# Patient Record
Sex: FEMALE | Race: BLACK OR AFRICAN AMERICAN | URBAN - METROPOLITAN AREA
[De-identification: names, ages, dates, MRNs, and addresses within clinical notes are randomized per-mention and may not be internally consistent; named-entity substitution may affect disease eponyms.]

---

## 2020-06-24 ENCOUNTER — OFFICE VISIT (OUTPATIENT)
Dept: URBAN - METROPOLITAN AREA TELEHEALTH 2 | Facility: TELEHEALTH | Age: 40
End: 2020-06-24
Payer: COMMERCIAL

## 2020-06-24 DIAGNOSIS — K21.0 GERD WITH ESOPHAGITIS: ICD-10-CM

## 2020-06-24 PROCEDURE — 99214 OFFICE O/P EST MOD 30 MIN: CPT | Performed by: INTERNAL MEDICINE

## 2020-06-24 RX ORDER — PANTOPRAZOLE SODIUM 40 MG/1
1 TABLET TABLET, DELAYED RELEASE ORAL ONCE A DAY
Qty: 30 TABLET | Refills: 3 | OUTPATIENT
Start: 2020-06-24

## 2020-06-24 NOTE — HPI-TODAY'S VISIT:
The patient is a 38 yo F here for follow up of an EGD performed for GERD.  The examination revealed eosinophilic esophagitis.  The patient denies known food allergies, dysphagia and vomiting but does admit to heartburn.

## 2020-11-03 ENCOUNTER — TELEPHONE ENCOUNTER (OUTPATIENT)
Dept: URBAN - METROPOLITAN AREA CLINIC 92 | Facility: CLINIC | Age: 40
End: 2020-11-03

## 2020-12-22 ENCOUNTER — OFFICE VISIT (OUTPATIENT)
Dept: URBAN - METROPOLITAN AREA CLINIC 92 | Facility: CLINIC | Age: 40
End: 2020-12-22
Payer: COMMERCIAL

## 2020-12-22 VITALS
HEART RATE: 70 BPM | TEMPERATURE: 97.1 F | BODY MASS INDEX: 26.98 KG/M2 | WEIGHT: 158 LBS | SYSTOLIC BLOOD PRESSURE: 128 MMHG | DIASTOLIC BLOOD PRESSURE: 72 MMHG | HEIGHT: 64 IN

## 2020-12-22 DIAGNOSIS — K92.1 RECTAL BLEEDING: ICD-10-CM

## 2020-12-22 DIAGNOSIS — K20.0 EOSINOPHILIC ESOPHAGITIS: ICD-10-CM

## 2020-12-22 PROCEDURE — 99214 OFFICE O/P EST MOD 30 MIN: CPT | Performed by: INTERNAL MEDICINE

## 2020-12-22 RX ORDER — PANTOPRAZOLE SODIUM 40 MG/1
1 TABLET TABLET, DELAYED RELEASE ORAL ONCE A DAY
Qty: 30 TABLET | Refills: 3 | Status: ACTIVE | COMMUNITY
Start: 2020-06-24

## 2020-12-22 RX ORDER — FLUTICASONE PROPIONATE 220 UG/1
1 PUFF AEROSOL, METERED RESPIRATORY (INHALATION) TWICE A DAY
Qty: 1 | Refills: 1 | OUTPATIENT
Start: 2020-12-22 | End: 2021-04-21

## 2020-12-22 NOTE — HPI-OTHER HISTORIES
The  patient is here for follow up of an EGD February foe epigastric pain.  The examinaton demonstrated gastric erosions.  Biopsies of the esophagus revealed eosiniophilic esophagitis.  Dysphagia symptoms have not improved with daily PPI use.  Patient states pills get stuck in her esophagus.  She takes extra care to chew her food well.    She also reports three episodes of rectal bleeding over 7 months.  Blood was noted in the bowl.  She denies straining.  Bleeding lasted for three days.  Bowel movements are daily and she has the sensation of complete emptying.   Each time she bled she had pain with the passage of stool.  This has not happened since Ocotber 2020.    A colonoscopy 2019 was unremarkable.

## 2020-12-23 ENCOUNTER — WEB ENCOUNTER (OUTPATIENT)
Dept: URBAN - METROPOLITAN AREA CLINIC 92 | Facility: CLINIC | Age: 40
End: 2020-12-23

## 2021-02-23 ENCOUNTER — OFFICE VISIT (OUTPATIENT)
Dept: URBAN - METROPOLITAN AREA CLINIC 92 | Facility: CLINIC | Age: 41
End: 2021-02-23
Payer: COMMERCIAL

## 2021-02-23 DIAGNOSIS — K20.0 EOSINOPHILIC ESOPHAGITIS: ICD-10-CM

## 2021-02-23 DIAGNOSIS — K21.9 GASTROESOPHAGEAL REFLUX DISEASE WITHOUT ESOPHAGITIS: ICD-10-CM

## 2021-02-23 PROCEDURE — 99214 OFFICE O/P EST MOD 30 MIN: CPT | Performed by: INTERNAL MEDICINE

## 2021-02-23 RX ORDER — FLUTICASONE PROPIONATE 220 UG/1
1 PUFF AEROSOL, METERED RESPIRATORY (INHALATION) TWICE A DAY
Qty: 1 | Refills: 1 | Status: ON HOLD | COMMUNITY
Start: 2020-12-22 | End: 2021-04-21

## 2021-02-23 RX ORDER — PANTOPRAZOLE SODIUM 40 MG/1
1 TABLET TABLET, DELAYED RELEASE ORAL ONCE A DAY
Qty: 30 TABLET | Refills: 3
Start: 2020-06-24

## 2021-02-23 RX ORDER — PANTOPRAZOLE SODIUM 40 MG/1
1 TABLET TABLET, DELAYED RELEASE ORAL ONCE A DAY
Qty: 30 TABLET | Refills: 3 | Status: ON HOLD | COMMUNITY
Start: 2020-06-24

## 2021-02-23 NOTE — HPI-TODAY'S VISIT:
The patient is doing well since Flovent was initiated De ember 2020 for eosinophilic esophagitis.  She has only had a single episode of  dysphagia.  Last week she developed abdominal pain and a sour stomach after consuming pasta with jennifer.   Her symptoms have not responded to OTC Nexium and Pepto Bismol.  Pantoprazole has been effective in gthe past but she ran out of meds.

## 2021-02-23 NOTE — HPI-OTHER HISTORIES
(December 2020) The  patient is here for follow up of an EGD February foe epigastric pain.  The examinaton demonstrated gastric erosions.  Biopsies of the esophagus revealed eosiniophilic esophagitis.  Dysphagia symptoms have not improved with daily PPI use.  Patient states pills get stuck in her esophagus.  She takes extra care to chew her food well.    She also reports three episodes of rectal bleeding over 7 months.  Blood was noted in the bowl.  She denies straining.  Bleeding lasted for three days.  Bowel movements are daily and she has the sensation of complete emptying.   Each time she bled she had pain with the passage of stool.  This has not happened since Ocotber 2020.    A colonoscopy 2019 was unremarkable.

## 2022-02-22 ENCOUNTER — WEB ENCOUNTER (OUTPATIENT)
Dept: URBAN - METROPOLITAN AREA CLINIC 92 | Facility: CLINIC | Age: 42
End: 2022-02-22

## 2022-02-22 ENCOUNTER — OFFICE VISIT (OUTPATIENT)
Dept: URBAN - METROPOLITAN AREA CLINIC 92 | Facility: CLINIC | Age: 42
End: 2022-02-22
Payer: COMMERCIAL

## 2022-02-22 ENCOUNTER — DASHBOARD ENCOUNTERS (OUTPATIENT)
Age: 42
End: 2022-02-22

## 2022-02-22 VITALS
DIASTOLIC BLOOD PRESSURE: 87 MMHG | HEIGHT: 64 IN | HEART RATE: 76 BPM | TEMPERATURE: 97.3 F | WEIGHT: 150 LBS | SYSTOLIC BLOOD PRESSURE: 125 MMHG | BODY MASS INDEX: 25.61 KG/M2

## 2022-02-22 DIAGNOSIS — K21.9 GASTROESOPHAGEAL REFLUX DISEASE WITHOUT ESOPHAGITIS: ICD-10-CM

## 2022-02-22 DIAGNOSIS — K20.0 EOSINOPHILIC ESOPHAGITIS: ICD-10-CM

## 2022-02-22 PROBLEM — 235599003 EOSINOPHILIC ESOPHAGITIS: Status: ACTIVE | Noted: 2020-06-24

## 2022-02-22 PROBLEM — 266435005: Status: ACTIVE | Noted: 2021-02-23

## 2022-02-22 PROCEDURE — 99214 OFFICE O/P EST MOD 30 MIN: CPT | Performed by: INTERNAL MEDICINE

## 2022-02-22 RX ORDER — PANTOPRAZOLE SODIUM 40 MG/1
1 TABLET TABLET, DELAYED RELEASE ORAL ONCE A DAY
Qty: 30 TABLET | Refills: 3 | Status: DISCONTINUED | COMMUNITY
Start: 2020-06-24

## 2022-02-22 RX ORDER — PANTOPRAZOLE SODIUM 40 MG/1
1 TABLET TABLET, DELAYED RELEASE ORAL ONCE A DAY
Qty: 90 | Refills: 3

## 2022-02-22 NOTE — HPI-OTHER HISTORIES
(December 2020) The  patient is here for follow up of an EGD February for epigastric pain.  The examinaton demonstrated gastric erosions.  Biopsies of the esophagus revealed eosiniophilic esophagitis.  Dysphagia symptoms have not improved with daily PPI use.  Patient states pills get stuck in her esophagus.  She takes extra care to chew her food well.    She also reports three episodes of rectal bleeding over 7 months.  Blood was noted in the bowl.  She denies straining.  Bleeding lasted for three days.  Bowel movements are daily and she has the sensation of complete emptying.   Each time she bled she had pain with the passage of stool.  This has not happened since Ocotber 2020.    A colonoscopy 2019 was unremarkable.   (February 2021) The patient is doing well since Flovent was initiated December 2020 for eosinophilic esophagitis.  She has only had a single episode of  dysphagia.  Last week she developed abdominal pain and a sour stomach after consuming pasta with jennifer.   Her symptoms have not responded to OTC Nexium and Pepto Bismol.  Pantoprazole has been effective in the past but she ran out of meds.

## 2022-02-22 NOTE — HPI-TODAY'S VISIT:
The patient is here for follow up.  She is doing well on Protonix taken about 1 - 2 times per week.  She admits to occasional dysphagia.  She denies nausea, vomiting, early satiety and weight loss.  She is requesting a refill.